# Patient Record
Sex: MALE | Race: WHITE | NOT HISPANIC OR LATINO | Employment: UNEMPLOYED | URBAN - METROPOLITAN AREA
[De-identification: names, ages, dates, MRNs, and addresses within clinical notes are randomized per-mention and may not be internally consistent; named-entity substitution may affect disease eponyms.]

---

## 2017-07-24 ENCOUNTER — OFFICE VISIT (OUTPATIENT)
Dept: OPHTHALMOLOGY | Facility: CLINIC | Age: 22
End: 2017-07-24
Attending: OPTOMETRIST
Payer: COMMERCIAL

## 2017-07-24 DIAGNOSIS — H54.3 LOW, VISION, BOTH EYES: ICD-10-CM

## 2017-07-24 DIAGNOSIS — E70.319 OCULAR ALBINISM (H): ICD-10-CM

## 2017-07-24 DIAGNOSIS — H55.01 CONGENITAL NYSTAGMUS: Primary | ICD-10-CM

## 2017-07-24 DIAGNOSIS — H50.15 ALTERNATING EXOTROPIA: ICD-10-CM

## 2017-07-24 PROCEDURE — 99213 OFFICE O/P EST LOW 20 MIN: CPT | Mod: 25,ZF

## 2017-07-24 PROCEDURE — 92015 DETERMINE REFRACTIVE STATE: CPT | Mod: ZF

## 2017-07-24 PROCEDURE — 92081 LIMITED VISUAL FIELD XM: CPT | Mod: ZF | Performed by: OPTOMETRIST

## 2017-07-24 ASSESSMENT — REFRACTION_MANIFEST
OD_SPHERE: +4.75
OS_AXIS: 095
OS_SPHERE: +5.25
OD_AXIS: 090
OD_CYLINDER: +1.50
OS_CYLINDER: +1.00

## 2017-07-24 ASSESSMENT — CONF VISUAL FIELD
OS_NORMAL: 1
OD_NORMAL: 1

## 2017-07-24 ASSESSMENT — SLIT LAMP EXAM - LIDS
COMMENTS: NORMAL
COMMENTS: NORMAL

## 2017-07-24 ASSESSMENT — REFRACTION_WEARINGRX
OD_SPHERE: +5.75
OS_SPHERE: +5.50
OD_AXIS: 090
OS_AXIS: 090
OD_CYLINDER: +1.25
OS_CYLINDER: +1.25

## 2017-07-24 ASSESSMENT — EXTERNAL EXAM - RIGHT EYE: OD_EXAM: NORMAL

## 2017-07-24 ASSESSMENT — REFRACTION_CURRENTRX
OD_DIAMETER: 14.5
OS_DIAMETER: 14.5
OS_BASECURVE: 8.7
OD_BASECURVE: 8.7
OS_CYLINDER: -2.25
OD_AXIS: 180
OD_BRAND: BIOFINITY TORIC
OS_BRAND: BIOFINITY TORIC
OS_AXIS: 180
OD_SPHERE: +8.00
OD_CYLINDER: -2.25
OS_SPHERE: +8.00

## 2017-07-24 ASSESSMENT — EXTERNAL EXAM - LEFT EYE: OS_EXAM: NORMAL

## 2017-07-24 ASSESSMENT — VISUAL ACUITY
CORRECTION_TYPE: CONTACTS
OD_CC: 20/60
OD_CC+: +1
OS_CC+: +2
METHOD: SNELLEN - LINEAR
OS_CC: 20/80

## 2017-07-24 ASSESSMENT — CUP TO DISC RATIO
OD_RATIO: 0.1
OS_RATIO: 0.1

## 2017-07-24 ASSESSMENT — REFRACTION
OS_AXIS: 095
OD_AXIS: 088
OS_SPHERE: +5.75
OS_CYLINDER: +1.75
OD_CYLINDER: +2.00
OD_SPHERE: +5.50

## 2017-07-24 ASSESSMENT — TONOMETRY
OD_IOP_MMHG: 20
OS_IOP_MMHG: 16
IOP_METHOD: ICARE

## 2017-07-24 NOTE — PROGRESS NOTES
"Chief Complaints and History of Present Illnesses   Patient presents with     Albinism Follow Up     Needs DMV form filled out today. Wears contacts most of the time, comfort and vision is good. No changes in vision noted. No photosensitivity.        HPI    Symptoms:              Comments:  Vision stable be  Wearing contact lenses (Biofinity Toric) most of the time with good comfort  No significant strabismus or head position  No light sensitivity  Roxana Zamudio, OD               Primary care: Giovanni Swenson   Referring provider: Giovanni Swenson  Assessment & Plan   Javan Gaxiola is a 21 year old male who presents with:     Congenital nystagmus  Ocular albinism (H)  Low, vision, both eyes  Vision stable both eyes. Glasses Rx decreased slightly. Filled out DMV form. Monitor.    Contact lens trials sent home with decreased cl rx. If vision is not satisfactory, call or return to clinic.    Alternating exotropia  Small, good cosmesis. Monitor.     Further details of the management plan can be found in the \"Patient Instructions\" section which was printed and given to the patient at checkout.  Return in about 1 year (around 7/24/2018).  Complete documentation of historical and exam elements from today's encounter can be found in the full encounter summary report (not reduplicated in this progress note). I personally obtained the chief complaint(s) and history of present illness.  I confirmed and edited as necessary the review of systems, past medical/surgical history, family history, social history, and examination findings as documented by others; and I examined the patient myself. I personally reviewed the relevant tests, images, and reports as documented above. I formulated and edited as necessary the assessment and plan and discussed the findings and management plan with the patient and family.  Final Contact Lens Rx      Brand Base Curve Diameter Sphere Cylinder Axis   Right Biofinity Toric 8.7 14.5 +7.00 -1.75 180 "   Left Biofinity Toric 8.7 14.5 +7.00 -1.75 180       Expiration Date:  7/25/2019    Replacement:  Monthly

## 2017-07-24 NOTE — MR AVS SNAPSHOT
After Visit Summary   2017    Javan Gaxiola    MRN: [id number]           Patient Information     Date Of Birth          1995        Visit Information        Provider Department      2017 2:30 PM Roxana Zamudio, DARIUSZ [facility]         Follow-ups after your visit        Who to contact     Please call your clinic at [phone] to:    Ask questions about your health    Make or cancel appointments    Discuss your medicines    Learn about your test results    Speak to your doctor   If you have compliments or concerns about an experience at your clinic, or if you wish to file a complaint, please contact [facility] Patient Relations at [phone] or email us at [email or web address]         Additional Information About Your Visit        MyChart Information     Moblyng is an electronic gateway that provides easy, online access to your medical records. With Moblyng, you can request a clinic appointment, read your test results, renew a prescription or communicate with your care team.     To sign up for Moblyng visit the website at [email or web address]   You will be asked to enter the access code listed below, as well as some personal information. Please follow the directions to create your username and password.     Your access code is: [email or web address]  Expires: 10/22/2017  3:09 PM     Your access code will  in 90 days. If you need help or a new code, please contact your [facility] or call [phone] for assistance.        Care EveryWhere ID     This is your Care EveryWhere ID. This could be used by other organizations to access your [place] medical records  [id number]         Blood Pressure from Last 3 Encounters:   07/17/15 114/68   13 127/63   13 123/71    Weight from Last 3 Encounters:   07/17/15 117.2 kg (258 lb 4.8 oz) (>99 %)*   13 126.3 kg (278 lb 7.1 oz) (>99 %)*   13  126.2 kg (278 lb 3.5 oz) (>99 %)*     * Growth percentiles are based on [facility] 2-20 Years data.              Today, you had the following     No orders found for display       Primary Care Provider Office Phone # Fax #    Giovanni Swenson [phone]       [facility] [address] [facility] [address]        Equal Access to Services     HANH SULLIVAN : Hadii aad ku hadasho Soomaali, waaxda luqadaha, qaybta kaalmada adeegyada, waxay idiin hayaan adeeg kharash lacelinen . So [facility] [place] [phone].    ATENCIÓN: Si habla espjob, tiene a ram disposición servicios gratuitos de asistencia lingüística. Llame al [phone].    We comply with applicable federal civil rights laws and [place] laws. We do not discriminate on the basis of race, color, national origin, age, disability sex, sexual orientation or gender identity.            Thank you!     Thank you for choosing [facility]  for your care. Our goal is always to provide you with excellent care. Hearing back from our patients is one way we can continue to improve our services. Please take a few minutes to complete the written survey that you may receive in the mail after your visit with us. Thank you!             Your Updated Medication List - Protect others around you: Learn how to safely use, store and throw away your medicines at www.disposemymeds.org.      Notice  As of 7/24/2017  3:09 PM    You have not been prescribed any medications.

## 2017-07-24 NOTE — NURSING NOTE
Chief Complaints and History of Present Illnesses   Patient presents with     Albinism Follow Up     Needs DMV form filled out today. Wears contacts most of the time, comfort and vision is good. No changes in vision noted. No photosensitivity.

## 2018-09-20 ENCOUNTER — OFFICE VISIT (OUTPATIENT)
Dept: OPHTHALMOLOGY | Facility: CLINIC | Age: 23
End: 2018-09-20
Attending: OPTOMETRIST
Payer: COMMERCIAL

## 2018-09-20 DIAGNOSIS — H10.13 ALLERGIC CONJUNCTIVITIS OF BOTH EYES: ICD-10-CM

## 2018-09-20 DIAGNOSIS — H55.01 CONGENITAL NYSTAGMUS: ICD-10-CM

## 2018-09-20 DIAGNOSIS — E70.319 OCULAR ALBINISM (H): Primary | ICD-10-CM

## 2018-09-20 DIAGNOSIS — H54.3 LOW, VISION, BOTH EYES: ICD-10-CM

## 2018-09-20 PROCEDURE — 92081 LIMITED VISUAL FIELD XM: CPT | Mod: ZF | Performed by: OPTOMETRIST

## 2018-09-20 RX ORDER — OLOPATADINE HYDROCHLORIDE 2 MG/ML
1 SOLUTION/ DROPS OPHTHALMIC DAILY
Qty: 1 BOTTLE | Refills: 11 | Status: SHIPPED | OUTPATIENT
Start: 2018-09-20 | End: 2024-01-30

## 2018-09-20 ASSESSMENT — REFRACTION_WEARINGRX
OS_CYLINDER: +1.00
OS_SPHERE: +5.25
OD_SPHERE: +4.75
OD_AXIS: 090
OD_CYLINDER: +1.50
OS_AXIS: 095

## 2018-09-20 ASSESSMENT — VISUAL ACUITY
METHOD: SNELLEN - LINEAR
OS_CC: 20/70
OS_SC: 20/80+2
OD_CC: 20/60+1
OD_SC: 20/70-1

## 2018-09-20 ASSESSMENT — REFRACTION_MANIFEST
OS_SPHERE: +5.25
OS_CYLINDER: +1.00
OS_AXIS: 095
OD_SPHERE: +4.75
OD_CYLINDER: +1.50
OD_AXIS: 100

## 2018-09-20 ASSESSMENT — TONOMETRY
OD_IOP_MMHG: 15
IOP_METHOD: ICARE
OS_IOP_MMHG: 17

## 2018-09-20 ASSESSMENT — CUP TO DISC RATIO
OS_RATIO: 0.1
OD_RATIO: 0.1

## 2018-09-20 ASSESSMENT — CONF VISUAL FIELD
OS_NORMAL: 1
METHOD: COUNTING FINGERS
OD_NORMAL: 1

## 2018-09-20 ASSESSMENT — SLIT LAMP EXAM - LIDS
COMMENTS: NORMAL
COMMENTS: NORMAL

## 2018-09-20 ASSESSMENT — EXTERNAL EXAM - LEFT EYE: OS_EXAM: NORMAL

## 2018-09-20 ASSESSMENT — EXTERNAL EXAM - RIGHT EYE: OD_EXAM: NORMAL

## 2018-09-20 NOTE — PROGRESS NOTES
"Chief Complaints and History of Present Illnesses   Patient presents with     Annual Eye Exam     dmv       HPI    Last Eye Exam:  7/24/17   Affected eye(s):  Both   Symptoms:     No double vision   No itching   No photophobia            Comments:  He reports that his vision is stable and denies concerns. DMV testing today  Occasional itching  + seasonal allergies  No diplopia  Roxana Zamudio, OD               Primary care: Givoanni Swenson   Referring provider: Giovanni Swenson  Assessment & Plan   Javan Gaxiola is a 23 year old male who presents with:         Ocular albinism (H)  Congenital nystagmus  Low, vision, both eyes  Vision improved BE today to 20/50-2+. Filled out DMV form, 55 mph restriction. Glasses prescription given, recommend full time wear.    Allergic conjunctivitis of both eyes  Use Pataday one drop daily in each eye as needed for itching.  - olopatadine HCl (PATADAY) 0.2 % SOLN; Place 1 drop into both eyes daily     Further details of the management plan can be found in the \"Patient Instructions\" section which was printed and given to the patient at checkout.  Return in about 1 year (around 9/20/2019).  Complete documentation of historical and exam elements from today's encounter can be found in the full encounter summary report (not reduplicated in this progress note). I personally obtained the chief complaint(s) and history of present illness.  I confirmed and edited as necessary the review of systems, past medical/surgical history, family history, social history, and examination findings as documented by others; and I examined the patient myself. I personally reviewed the relevant tests, images, and reports as documented above. I formulated and edited as necessary the assessment and plan and discussed the findings and management plan with the patient and family. -- Roxana Zamudio, OD     "

## 2018-09-20 NOTE — LETTER
2018    Giovanni Swenson MD  [facility]  [address] [place] [address]    RE:  Javan Gaxiola     : 1995   MRN: [id number]    Dear Dr. Swenson:    It was my pleasure to see Javan Gaxiola on 2018.  In summary, Javan is a 23-year-old male who presents with:   Ocular albinism (H)  Congenital nystagmus  Low, vision, both eyes  Vision improved BE today to 20/50-2+. Filled out DMV form, 55 mph restriction. Glasses prescription given, recommend full-time wear.    Allergic conjunctivitis of both eyes  Use Pataday one drop daily in each eye as needed for itching.  - olopatadine HCl (PATADAY) 0.2 % SOLN; Place 1 drop into both eyes daily    Thank you for the opportunity to care for Javan.  If you would like to discuss anything further, please do not hesitate to contact me.  I have asked him to return in about 1 year (around 2019).      Sincerely,    Roxana Zamudio, DARIUSZ  Department of Ophthalmology & Visual Neurosciences  [facility]    CC:  [facility]  Javan Gaxiola

## 2022-10-21 NOTE — MR AVS SNAPSHOT
After Visit Summary   9/20/2018    Javan Gaxiola    MRN: [id number]           Patient Information     Date Of Birth          1995        Visit Information        Provider Department      9/20/2018 11:00 AM Roxana Zamudio, OD [facility]        Today's Diagnoses     Allergic conjunctivitis of both eyes    -  1    Ocular albinism (H)        Congenital nystagmus        Low, vision, both eyes          Care Instructions    Glasses prescription given, recommend full time wear.            Follow-ups after your visit        Follow-up notes from your care team     Return in about 1 year (around 9/20/2019).      Who to contact     Please call your clinic at [phone] to:    Ask questions about your health    Make or cancel appointments    Discuss your medicines    Learn about your test results    Speak to your doctor            Additional Information About Your Visit        Care EveryWhere ID     This is your Care EveryWhere ID. This could be used by other organizations to access your [place] medical records  [id number]         Blood Pressure from Last 3 Encounters:   07/17/15 114/68   08/29/13 127/63   08/26/13 123/71    Weight from Last 3 Encounters:   07/17/15 117.2 kg (258 lb 4.8 oz) (>99 %)*   08/29/13 126.3 kg (278 lb 7.1 oz) (>99 %)*   08/26/13 126.2 kg (278 lb 3.5 oz) (>99 %)*     * Growth percentiles are based on [facility] 2-20 Years data.              Today, you had the following     No orders found for display         Today's Medication Changes          These changes are accurate as of 9/20/18 11:57 AM.  If you have any questions, ask your nurse or doctor.               Start taking these medicines.        Dose/Directions    olopatadine HCl 0.2 % Soln   Commonly known as:  PATADAY   Used for:  Allergic conjunctivitis of both eyes   Started by:  Roxana Zamudio, OD        Dose:  1 drop   Place 1 drop into both eyes daily   Quantity:  1 Bottle   Refills:  11            Where to get your  medicines      These medications were sent to [facility] - [place], [place] - [address]  [address], [facility] [address]     Phone:  [phone]     olopatadine HCl 0.2 % Soln                Primary Care Provider Office Phone # Fax #    Giovanni Swenson [phone]       [facility] [address] [facility] [address]        Equal Access to Services     HANH SULLIVAN : Hadii aad ku hadasho Soomaali, waaxda luqadaha, qaybta kaalmada adeegyada, waxay idiin hayaan adeeg kharash la'chirag aguilar. So [facility] [phone].    ATENCIÓN: Si habla español, tiene a ram disposición servicios gratuitos de asistencia lingüística. [facility] [phone].    We comply with applicable federal civil rights laws and [place] laws. We do not discriminate on the basis of race, color, national origin, age, disability, sex, sexual orientation, or gender identity.            Thank you!     Thank you for choosing [facility]  for your care. Our goal is always to provide you with excellent care. Hearing back from our patients is one way we can continue to improve our services. Please take a few minutes to complete the written survey that you may receive in the mail after your visit with us. Thank you!             Your Updated Medication List - Protect others around you: Learn how to safely use, store and throw away your medicines at www.disposemymeds.org.          This list is accurate as of 9/20/18 11:57 AM.  Always use your most recent med list.                   Brand Name Dispense Instructions for use Diagnosis    olopatadine HCl 0.2 % Soln    PATADAY    1 Bottle    Place 1 drop into both eyes daily    Allergic conjunctivitis of both eyes          PAST MEDICAL HISTORY:  DM (diabetes mellitus)     HLD (hyperlipidemia)     HTN (hypertension)

## 2024-01-30 ENCOUNTER — TELEPHONE (OUTPATIENT)
Dept: NURSING | Facility: CLINIC | Age: 29
End: 2024-01-30
Payer: COMMERCIAL

## 2024-01-30 ENCOUNTER — OFFICE VISIT (OUTPATIENT)
Dept: FAMILY MEDICINE | Facility: CLINIC | Age: 29
End: 2024-01-30
Payer: COMMERCIAL

## 2024-01-30 VITALS
HEART RATE: 93 BPM | HEIGHT: 73 IN | DIASTOLIC BLOOD PRESSURE: 75 MMHG | OXYGEN SATURATION: 97 % | SYSTOLIC BLOOD PRESSURE: 132 MMHG | TEMPERATURE: 98.2 F | WEIGHT: 307.2 LBS | RESPIRATION RATE: 15 BRPM | BODY MASS INDEX: 40.71 KG/M2

## 2024-01-30 DIAGNOSIS — M79.674 PAIN OF TOE OF RIGHT FOOT: Primary | ICD-10-CM

## 2024-01-30 DIAGNOSIS — R73.03 PREDIABETES: ICD-10-CM

## 2024-01-30 LAB — HBA1C MFR BLD: 5.5 % (ref 0–5.6)

## 2024-01-30 PROCEDURE — 83036 HEMOGLOBIN GLYCOSYLATED A1C: CPT | Performed by: GENERAL PRACTICE

## 2024-01-30 PROCEDURE — 80053 COMPREHEN METABOLIC PANEL: CPT | Performed by: GENERAL PRACTICE

## 2024-01-30 PROCEDURE — 36415 COLL VENOUS BLD VENIPUNCTURE: CPT | Performed by: GENERAL PRACTICE

## 2024-01-30 PROCEDURE — 99203 OFFICE O/P NEW LOW 30 MIN: CPT | Performed by: GENERAL PRACTICE

## 2024-01-30 PROCEDURE — 84443 ASSAY THYROID STIM HORMONE: CPT | Performed by: GENERAL PRACTICE

## 2024-01-30 RX ORDER — METHYLPREDNISOLONE 4 MG
TABLET, DOSE PACK ORAL
COMMUNITY
Start: 2024-01-29

## 2024-01-30 ASSESSMENT — PAIN SCALES - GENERAL: PAINLEVEL: NO PAIN (0)

## 2024-01-30 NOTE — TELEPHONE ENCOUNTER
Appt request:     Pt calling to report that he was seen at [facility] yesterday and is being treated for Gout in his great toe, but he needs a lab order to check to see if it is actually gout. They did not draw labs at [facility].     Writer reviewed the types of appts with patient and he wanted to be transferred to scheduling to check appt availability for a virtual appt.     Shalini Luke RN  [facility] Nurse Advisor 2:22 PM 1/30/2024

## 2024-01-30 NOTE — LETTER
February 5, 2024      Javan Gaxiola  [address] [facility] [place] [address]        Dear ,    Labs were in normal range.     Leena Perez MD   Internal Medicine   [facility] Orders   Comprehensive metabolic panel (BMP + Alb, Alk Phos, ALT, AST, Total. Bili, TP)   Result Value Ref Range    Sodium 140 135 - 145 mmol/L      Comment:      Reference intervals for this test were updated on 09/26/2023 to more accurately reflect our healthy population. There may be differences in the flagging of prior results with similar values performed with this method. Interpretation of those prior results can be made in the context of the updated reference intervals.     Potassium 4.2 3.4 - 5.3 mmol/L    Carbon Dioxide (CO2) 26 22 - 29 mmol/L    Anion Gap 13 7 - 15 mmol/L    Urea Nitrogen 6.7 6.0 - 20.0 mg/dL    Creatinine 0.85 0.67 - 1.17 mg/dL    GFR Estimate >90 >60 mL/min/1.73m2    Calcium 9.8 8.6 - 10.0 mg/dL    Chloride 101 98 - 107 mmol/L    Glucose 120 (H) 70 - 99 mg/dL    Alkaline Phosphatase 78 40 - 150 U/L      Comment:      Reference intervals for this test were updated on 11/14/2023 to more accurately reflect our healthy population. There may be differences in the flagging of prior results with similar values performed with this method. Interpretation of those prior results can be made in the context of the updated reference intervals.    AST 25 0 - 45 U/L      Comment:      Reference intervals for this test were updated on 6/12/2023 to more accurately reflect our healthy population. There may be differences in the flagging of prior results with similar values performed with this method. Interpretation of those prior results can be made in the context of the updated reference intervals.    ALT 72 (H) 0 - 70 U/L      Comment:      Reference intervals for this test were updated on 6/12/2023 to more accurately reflect our healthy population. There may be differences in the  flagging of prior results with similar values performed with this method. Interpretation of those prior results can be made in the context of the updated reference intervals.      Protein Total 7.3 6.4 - 8.3 g/dL    Albumin 4.8 3.5 - 5.2 g/dL    Bilirubin Total 0.3 <=1.2 mg/dL   Hemoglobin A1c   Result Value Ref Range    Hemoglobin A1C 5.5 0.0 - 5.6 %      Comment:      Normal <5.7%   Prediabetes 5.7-6.4%    Diabetes 6.5% or higher     Note: Adopted from ADA consensus guidelines.   TSH with free T4 reflex   Result Value Ref Range    TSH 2.17 0.30 - 4.20 uIU/mL

## 2024-01-30 NOTE — PROGRESS NOTES
"  Assessment & Plan     Pain of toe of right foot  Significantly improved  Provided gout information for patient  No maintenance allopurinol for initial episode  No need to check uric acid level with with acute flare  Noticed elevated A1C in the past, check A1C  Check kidney function        MED REC REQUIRED  Post Medication Reconciliation Status:     BMI  Estimated body mass index is 40.53 kg/m  as calculated from the following:    Height as of this encounter: 1.854 m (6' 1\").    Weight as of this encounter: 139.3 kg (307 lb 3.2 oz).   Weight management plan: Discussed healthy diet and exercise guidelines        Francheska Edouard is a 28 year old, presenting for the following health issues:  ER F/U        1/30/2024     3:17 PM   Additional Questions   Roomed by Cynthia FARRELL MA   Accompanied by self         1/30/2024     3:17 PM   Patient Reported Additional Medications   Patient reports taking the following new medications none       Follow-up right toe pain.  Saw [facility] yesterday, XR was normal, prescribed methylprednisone taper starting at 6 mg and pain has significantly improved.    Woke up today and no pain and went work  Started a new diet since January, 2024             ED/UC Followup:    Facility:  [facility]   Date of visit: 1/29/24  Reason for visit: Right big toe pain   Current Status: pt states he's feeling better today due to prescribed steroids he's on for pain.        Review of Systems  Constitutional, HEENT, cardiovascular, pulmonary, gi and gu systems are negative, except as otherwise noted.      Objective    /75 (BP Location: Right arm, Patient Position: Sitting, Cuff Size: Adult Large)   Pulse 93   Temp 98.2  F (36.8  C) (Oral)   Resp 15   Ht 1.854 m (6' 1\")   Wt 139.3 kg (307 lb 3.2 oz)   SpO2 97%   BMI 40.53 kg/m    Body mass index is 40.53 kg/m .  Physical Exam  Constitutional:       Appearance: Normal appearance.   Eyes:      Extraocular Movements: Extraocular movements intact. "   Cardiovascular:      Rate and Rhythm: Normal rate and regular rhythm.   Pulmonary:      Effort: Pulmonary effort is normal.      Breath sounds: Normal breath sounds.   Abdominal:      Palpations: Abdomen is soft.   Musculoskeletal:         General: Normal range of motion.      Cervical back: Normal range of motion.        Feet:    Feet:      Comments: R Big Toe: wearing shock and shoe today, good ROM in the big toe, no tenderness to palpation   Skin:     General: Skin is warm.   Neurological:      General: No focal deficit present.      Mental Status: He is alert and oriented to person, place, and time. Mental status is at baseline.   Psychiatric:         Mood and Affect: Mood normal.         Behavior: Behavior normal.         Thought Content: Thought content normal.         Judgment: Judgment normal.                    Signed Electronically by: Leena Perez MD

## 2024-01-31 LAB
ALBUMIN SERPL BCG-MCNC: 4.8 G/DL (ref 3.5–5.2)
ALP SERPL-CCNC: 78 U/L (ref 40–150)
ALT SERPL W P-5'-P-CCNC: 72 U/L (ref 0–70)
ANION GAP SERPL CALCULATED.3IONS-SCNC: 13 MMOL/L (ref 7–15)
AST SERPL W P-5'-P-CCNC: 25 U/L (ref 0–45)
BILIRUB SERPL-MCNC: 0.3 MG/DL
BUN SERPL-MCNC: 6.7 MG/DL (ref 6–20)
CALCIUM SERPL-MCNC: 9.8 MG/DL (ref 8.6–10)
CHLORIDE SERPL-SCNC: 101 MMOL/L (ref 98–107)
CREAT SERPL-MCNC: 0.85 MG/DL (ref 0.67–1.17)
DEPRECATED HCO3 PLAS-SCNC: 26 MMOL/L (ref 22–29)
EGFRCR SERPLBLD CKD-EPI 2021: >90 ML/MIN/1.73M2
GLUCOSE SERPL-MCNC: 120 MG/DL (ref 70–99)
POTASSIUM SERPL-SCNC: 4.2 MMOL/L (ref 3.4–5.3)
PROT SERPL-MCNC: 7.3 G/DL (ref 6.4–8.3)
SODIUM SERPL-SCNC: 140 MMOL/L (ref 135–145)
TSH SERPL DL<=0.005 MIU/L-ACNC: 2.17 UIU/ML (ref 0.3–4.2)